# Patient Record
Sex: MALE | Race: WHITE | Employment: FULL TIME | URBAN - METROPOLITAN AREA
[De-identification: names, ages, dates, MRNs, and addresses within clinical notes are randomized per-mention and may not be internally consistent; named-entity substitution may affect disease eponyms.]

---

## 2017-10-31 ENCOUNTER — HOSPITAL ENCOUNTER (EMERGENCY)
Age: 22
Discharge: HOME | End: 2017-10-31
Attending: EMERGENCY MEDICINE

## 2017-10-31 ENCOUNTER — APPOINTMENT (OUTPATIENT)
Dept: CT IMAGING | Age: 22
End: 2017-10-31

## 2017-10-31 VITALS
RESPIRATION RATE: 18 BRPM | HEART RATE: 72 BPM | WEIGHT: 155 LBS | DIASTOLIC BLOOD PRESSURE: 65 MMHG | TEMPERATURE: 98 F | OXYGEN SATURATION: 96 % | SYSTOLIC BLOOD PRESSURE: 110 MMHG

## 2017-10-31 DIAGNOSIS — H00.014 HORDEOLUM EXTERNUM OF LEFT UPPER EYELID: Primary | ICD-10-CM

## 2017-10-31 PROCEDURE — 99283 EMERGENCY DEPT VISIT LOW MDM: CPT

## 2017-10-31 RX ORDER — SULFACETAMIDE SODIUM 100 MG/ML
2 SOLUTION/ DROPS OPHTHALMIC
Qty: 1 BOTTLE | Refills: 0 | Status: SHIPPED | OUTPATIENT
Start: 2017-10-31 | End: 2017-11-03

## 2017-10-31 RX ORDER — MECLIZINE HCL 12.5 MG/1
25 TABLET ORAL ONCE
Status: DISCONTINUED | OUTPATIENT
Start: 2017-10-31 | End: 2017-10-31

## 2017-10-31 RX ORDER — LORAZEPAM 2 MG/ML
1 INJECTION INTRAMUSCULAR ONCE
Status: DISCONTINUED | OUTPATIENT
Start: 2017-10-31 | End: 2017-10-31

## 2017-10-31 RX ORDER — SULFACETAMIDE SODIUM 100 MG/ML
2 SOLUTION/ DROPS OPHTHALMIC 2 TIMES DAILY
Qty: 1 BOTTLE | Refills: 0 | Status: SHIPPED | OUTPATIENT
Start: 2017-10-31 | End: 2017-11-10

## 2017-10-31 RX ORDER — ONDANSETRON 2 MG/ML
4 INJECTION INTRAMUSCULAR; INTRAVENOUS ONCE
Status: DISCONTINUED | OUTPATIENT
Start: 2017-10-31 | End: 2017-10-31

## 2017-10-31 RX ORDER — 0.9 % SODIUM CHLORIDE 0.9 %
1000 INTRAVENOUS SOLUTION INTRAVENOUS ONCE
Status: DISCONTINUED | OUTPATIENT
Start: 2017-10-31 | End: 2017-10-31

## 2017-10-31 ASSESSMENT — PAIN SCALES - GENERAL: PAINLEVEL_OUTOF10: 3

## 2017-10-31 ASSESSMENT — PAIN DESCRIPTION - LOCATION: LOCATION: HEAD

## 2017-10-31 ASSESSMENT — PAIN DESCRIPTION - PAIN TYPE: TYPE: ACUTE PAIN

## 2017-10-31 NOTE — ED PROVIDER NOTES
[facility] ED  [address] [place] [address]  Phone: [phone]  Fax: [phone]    Pt Name: Oneil Jackson  MRN: [id number]  [place] 1995  Date of evaluation: 10/31/2017      CHIEF COMPLAINT       Chief Complaint   Patient presents with    Facial Swelling     left eye         HISTORY OF PRESENT ILLNESS     Oneil Jackson is a 25 y.o. male who presents With swelling to the left upper eyelid which began 2 days ago. There is redness. There is no other HEENT complaints. Had styes in the past.  There is no ocular abnormalities. No medication prior to admission. REVIEW OF SYSTEMS       Other ROS negative except as noted above. PAST MEDICAL HISTORY    has no past medical history on file. SURGICAL HISTORY      has no past surgical history on file. CURRENT MEDICATIONS       Previous Medications    No medications on file       ALLERGIES     has No Known Allergies. FAMILY HISTORY     has no family status information on file. family history is not on file. SOCIAL HISTORY      reports that he has quit smoking. He has never used smokeless tobacco. He reports that he does not use drugs. PHYSICAL EXAM     INITIAL VITALS:  weight is 70.3 kg (155 lb). His oral temperature is 98 °F (36.7 °C). His blood pressure is 110/65 and his pulse is 72. His respiration is 18 and oxygen saturation is 96%. Constitutional: The patient is alert, well-developed, in no acute distress. Vital signs as noted. Eyes: Pupils equal and reactive to light. EOMI. Left upper eyelid is red without discrete abscess. No discharge. Rest of the globe is normal.  There is no conjunctival redness or discharge. Ears, nose, and throat: Oropharynx clear without masses, lesions or deformities. Neck: No masses, trachea midline. DIFFERENTIAL DIAGNOSIS/ MEDICAL DECISION MAKING:        Bleph-10 drops use as directed    Follow Exit Care instructions closely.     I have reviewed the disposition diagnosis with the patient and or their family/guardian. I have answered their questions and given discharge instructions. They voiced understanding of these instructions and did not have any further questions or complaints. DIAGNOSTIC RESULTS     RADIOLOGY:   Non-plain film images such as CT, Ultrasound and MRI are read by the radiologist. Plain radiographic images are visualized and preliminarily interpreted by the emergency physician with the below findings:  No orders to display         LABS:  No results found for this visit on 10/31/17. ABNORMAL LABS:  Labs Reviewed - No data to display           EMERGENCY DEPARTMENT COURSE:   Vitals:    Vitals:    10/31/17 0849   BP: 110/65   Pulse: 72   Resp: 18   Temp: 98 °F (36.7 °C)   TempSrc: Oral   SpO2: 96%   Weight: 70.3 kg (155 lb)     -------------------------  BP: 110/65, Temp: 98 °F (36.7 °C), Pulse: 72, Resp: 18    See DDX/MDM  (Differential Diagnosis/Medical Decision Making) above. FINAL IMPRESSION      1. Hordeolum externum of left upper eyelid          DISPOSITION/PLAN   DISPOSITION Decision to Discharge    Condition on Disposition    Fair    PATIENT REFERRED TO:  [facility]  [address] [address]  [phone]    If symptoms worsen      DISCHARGE MEDICATIONS:  New Prescriptions    SULFACETAMIDE (BLEPH-10) 10 % OPHTHALMIC SOLUTION    Place 2 drops into both eyes every 3 hours for 3 days       (Please note that portions of this note were completed with a voice recognition program.  Efforts were made to edit the dictations but occasionally words are mis-transcribed.)    Tomi Rosario,, DO  Attending Emergency Physician       Tomi Rosario, DO  10/31/17 [address] Ros[address],   10/31/17 0757